# Patient Record
Sex: MALE | Race: WHITE | URBAN - METROPOLITAN AREA
[De-identification: names, ages, dates, MRNs, and addresses within clinical notes are randomized per-mention and may not be internally consistent; named-entity substitution may affect disease eponyms.]

---

## 2017-03-08 ENCOUNTER — HOSPITAL ENCOUNTER (INPATIENT)
Facility: CLINIC | Age: 22
LOS: 4 days | Discharge: HOME | DRG: 897 | End: 2017-03-12
Attending: FAMILY MEDICINE | Admitting: PSYCHIATRY & NEUROLOGY
Payer: COMMERCIAL

## 2017-03-08 ENCOUNTER — TELEPHONE (OUTPATIENT)
Dept: BEHAVIORAL HEALTH | Facility: CLINIC | Age: 22
End: 2017-03-08

## 2017-03-08 DIAGNOSIS — F11.20 HEROIN DEPENDENCE (H): ICD-10-CM

## 2017-03-08 DIAGNOSIS — F11.23 OPIOID DEPENDENCE WITH WITHDRAWAL (H): ICD-10-CM

## 2017-03-08 DIAGNOSIS — F11.20 OPIOID USE DISORDER, SEVERE, DEPENDENCE (H): Primary | ICD-10-CM

## 2017-03-08 PROBLEM — F19.20 CHEMICAL DEPENDENCY (H): Status: ACTIVE | Noted: 2017-03-08

## 2017-03-08 PROCEDURE — 12800008 ZZH R&B CD ADULT

## 2017-03-08 PROCEDURE — 99284 EMERGENCY DEPT VISIT MOD MDM: CPT | Mod: Z6 | Performed by: FAMILY MEDICINE

## 2017-03-08 PROCEDURE — 25000132 ZZH RX MED GY IP 250 OP 250 PS 637: Performed by: PSYCHIATRY & NEUROLOGY

## 2017-03-08 PROCEDURE — 99285 EMERGENCY DEPT VISIT HI MDM: CPT | Performed by: FAMILY MEDICINE

## 2017-03-08 PROCEDURE — HZ2ZZZZ DETOXIFICATION SERVICES FOR SUBSTANCE ABUSE TREATMENT: ICD-10-PCS | Performed by: PSYCHIATRY & NEUROLOGY

## 2017-03-08 RX ORDER — OXYMETAZOLINE HYDROCHLORIDE 0.05 G/100ML
2 SPRAY NASAL 2 TIMES DAILY
Status: DISCONTINUED | OUTPATIENT
Start: 2017-03-08 | End: 2017-03-09

## 2017-03-08 RX ORDER — ALUMINA, MAGNESIA, AND SIMETHICONE 2400; 2400; 240 MG/30ML; MG/30ML; MG/30ML
30 SUSPENSION ORAL EVERY 4 HOURS PRN
Status: DISCONTINUED | OUTPATIENT
Start: 2017-03-08 | End: 2017-03-12 | Stop reason: HOSPADM

## 2017-03-08 RX ORDER — TRAZODONE HYDROCHLORIDE 50 MG/1
50 TABLET, FILM COATED ORAL
Status: DISCONTINUED | OUTPATIENT
Start: 2017-03-08 | End: 2017-03-12 | Stop reason: HOSPADM

## 2017-03-08 RX ORDER — HYDROXYZINE HYDROCHLORIDE 25 MG/1
25-50 TABLET, FILM COATED ORAL EVERY 4 HOURS PRN
Status: DISCONTINUED | OUTPATIENT
Start: 2017-03-08 | End: 2017-03-12 | Stop reason: HOSPADM

## 2017-03-08 RX ORDER — BISACODYL 10 MG
10 SUPPOSITORY, RECTAL RECTAL DAILY PRN
Status: DISCONTINUED | OUTPATIENT
Start: 2017-03-08 | End: 2017-03-12 | Stop reason: HOSPADM

## 2017-03-08 RX ORDER — ONDANSETRON 4 MG/1
4 TABLET, ORALLY DISINTEGRATING ORAL EVERY 6 HOURS PRN
Status: DISCONTINUED | OUTPATIENT
Start: 2017-03-08 | End: 2017-03-12 | Stop reason: HOSPADM

## 2017-03-08 RX ORDER — POLYETHYLENE GLYCOL 3350 17 G/17G
17 POWDER, FOR SOLUTION ORAL DAILY
Status: DISCONTINUED | OUTPATIENT
Start: 2017-03-08 | End: 2017-03-12 | Stop reason: HOSPADM

## 2017-03-08 RX ORDER — IBUPROFEN 600 MG/1
600 TABLET, FILM COATED ORAL EVERY 6 HOURS PRN
Status: DISCONTINUED | OUTPATIENT
Start: 2017-03-08 | End: 2017-03-12 | Stop reason: HOSPADM

## 2017-03-08 RX ORDER — ACETAMINOPHEN 325 MG/1
650 TABLET ORAL EVERY 4 HOURS PRN
Status: DISCONTINUED | OUTPATIENT
Start: 2017-03-08 | End: 2017-03-12 | Stop reason: HOSPADM

## 2017-03-08 RX ORDER — LORAZEPAM 1 MG/1
1-2 TABLET ORAL EVERY 4 HOURS PRN
Status: DISCONTINUED | OUTPATIENT
Start: 2017-03-08 | End: 2017-03-09

## 2017-03-08 RX ORDER — SENNOSIDES 8.6 MG
2 TABLET ORAL 2 TIMES DAILY PRN
Status: DISCONTINUED | OUTPATIENT
Start: 2017-03-08 | End: 2017-03-12 | Stop reason: HOSPADM

## 2017-03-08 RX ADMIN — NICOTINE POLACRILEX 8 MG: 4 GUM, CHEWING ORAL at 18:45

## 2017-03-08 RX ADMIN — HYDROXYZINE HYDROCHLORIDE 50 MG: 25 TABLET ORAL at 21:45

## 2017-03-08 RX ADMIN — OXYMETAZOLINE HYDROCHLORIDE 2 SPRAY: 5 SPRAY NASAL at 21:15

## 2017-03-08 RX ADMIN — LORAZEPAM 2 MG: 1 TABLET ORAL at 20:17

## 2017-03-08 RX ADMIN — POLYETHYLENE GLYCOL 3350 17 G: 17 POWDER, FOR SOLUTION ORAL at 18:49

## 2017-03-08 RX ADMIN — SENNOSIDES 2 TABLET: 8.6 TABLET, FILM COATED ORAL at 18:47

## 2017-03-08 RX ADMIN — TRAZODONE HYDROCHLORIDE 50 MG: 50 TABLET ORAL at 21:14

## 2017-03-08 ASSESSMENT — ENCOUNTER SYMPTOMS
FEVER: 0
SHORTNESS OF BREATH: 0
ABDOMINAL PAIN: 0

## 2017-03-08 ASSESSMENT — ACTIVITIES OF DAILY LIVING (ADL): GROOMING: INDEPENDENT

## 2017-03-08 NOTE — PROGRESS NOTES
03/08/17 1643   Patient Belongings   Did you bring any home meds/supplements to the hospital?  No   Patient Belongings other (see comments)   Belongings Search Yes   Clothing Search Yes   Second Staff Johnny Olivier RN   Bin: 2 pairs of sweat pants, shoes   No security, no locked drawer    ADMISSION:  I am responsible for any personal items that are not sent to the safe or pharmacy. [facility] is not responsible for loss, theft or damage of any property in my possession.    Patient Signature _____________________ Date/Time _____________________    Staff Signature _______________________ Date/Time _____________________    [facility] Staff person, if patient is unable/unwilling to sign  ___________________________________ Date/Time _____________________    DISCHARGE:  My personal items have been returned to me.   Patient Signature _____________________ Date/Time _____________________

## 2017-03-08 NOTE — ED PROVIDER NOTES
History     Chief Complaint   Patient presents with     Addiction Problem     Pt here for detox from Heroin; has bed held on 3A     HPI  Etienne Moore is a 21 year old male who presents emergency room seeking detox for his heroin addiction.  Patient has been using since August and has gotten to a level of use that he's never had in the past.  Patient states she's been using up to 2 g a day.  Last use was 9:00 this morning half a gram he is not currently experiencing any withdrawal symptoms.  Patient only physical complaint is ongoing problems with constipation otherwise denies any other acute medical conditions and denies any acute psychiatric concerns.    I have reviewed the Medications, Allergies, Past Medical and Surgical History, and Social History in the Epic system.    PERSONAL MEDICAL HISTORY  Past Medical History   Diagnosis Date     Substance abuse      Heroin     PAST SURGICAL HISTORY  History reviewed. No pertinent past surgical history.  FAMILY HISTORY  No family history on file.  SOCIAL HISTORY  Social History   Substance Use Topics     Smoking status: Current Every Day Smoker     Smokeless tobacco: Not on file     Alcohol use Yes     MEDICATIONS  No current facility-administered medications for this encounter.      No current outpatient prescriptions on file.     ALLERGIES  No Known Allergies      Review of Systems   Constitutional: Negative for fever.   Respiratory: Negative for shortness of breath.    Cardiovascular: Negative for chest pain.   Gastrointestinal: Negative for abdominal pain.   All other systems reviewed and are negative.      Physical Exam   BP: 127/57  Pulse: 76  Temp: 99.1  F (37.3  C)  Resp: 16  Weight: 87.7 kg (193 lb 6 oz)  SpO2: 98 %  Physical Exam   Constitutional: He is oriented to person, place, and time. No distress.   HENT:   Head: Atraumatic.   Mouth/Throat: Oropharynx is clear and moist. No oropharyngeal exudate.   Eyes: Pupils are equal, round, and reactive to light. No  scleral icterus.   Cardiovascular: Normal heart sounds and intact distal pulses.    Pulmonary/Chest: Breath sounds normal. No respiratory distress.   Abdominal: Soft. Bowel sounds are normal. There is no tenderness.   Musculoskeletal: He exhibits no edema or tenderness.   Neurological: He is alert and oriented to person, place, and time. He has normal reflexes. No cranial nerve deficit. He exhibits normal muscle tone. Coordination normal.   Skin: Skin is warm. No rash noted. He is not diaphoretic.   Psychiatric: He does not exhibit a depressed mood. He expresses no suicidal ideation.       ED Course     ED Course     Procedures         Critical Care time:  None  Labs/Imaging:    Urine tox screen pending          Assessments & Plan (with Medical Decision Making)       I have reviewed the nursing notes.    I have reviewed the findings, diagnosis, plan and need for follow up with the patient.  Patient with heroin dependence now seeking detox and treatment patient will be admitted to station 3A for stabilization.    New Prescriptions    No medications on file       Final diagnoses:   Heroin dependence (H)       3/8/2017   [facility] [place], EMERGENCY DEPARTMENT     Abhay Olson MD  03/08/17 6429

## 2017-03-08 NOTE — TELEPHONE ENCOUNTER
S - Dr. Christianson called with clinical for 22 yo male seeking detox.     B - heroin user, using since August, up to 2 grams daily IV last use this morning. Used less than 1/2 gram this morning.  No withdrawal sxs yet.  No acute mental health concerns.  Pt was previously sober from January to August after tx.  Pt relapsed and use started escalating quickly.  No acute medical concerns.      A - vol     R - Dr. Almazan accepted for Dr. Bang / 3a CD

## 2017-03-08 NOTE — IP AVS SNAPSHOT
MRN:[id number]                      After Visit Summary   3/8/2017    Etienne Moore    MRN: [id number]           Thank you!     Thank you for choosing [facility] for your care. Our goal is always to provide you with excellent care.        Patient Information     Date Of Birth          1995        About your hospital stay     You were admitted on:  March 8, 2017 You last received care in the:  [facility]    You were discharged on:  March 12, 2017       Who to Call     For medical emergencies, please call 911.  For non-urgent questions about your medical care, please call your primary care provider or clinic, None          Attending Provider     Provider Specialty    Abhay Olson MD Emergency Medicine    Merritt, Cassandra Polk MD Pediatrics       Primary Care Provider    Physician No Ref-Primary       No address on file        Further instructions from your care team       Behavioral Discharge Planning and Instructions  THANK YOU FOR CHOOSING THE [address]  [phone]    Summary: You were admitted to Station 3A on 3/8/17 for detoxification from opiates.  A medical exam was performed that included lab work. You have met with a  and opted to return to [place] for inpatient residential Chemical Dependency treatment.  Please take care and make your recovery a priority, Etienne.     Recommendation:  Residential Treatment, psychotherapy, sober support group engagement and active work with a sponsor or  through [facility].    Main Diagnosis: Opiate dependence    Major Treatments, Procedures and Findings:  You have had a chemical dependency assessment.  You have had labs drawn and those results have been reviewed with you.  Please take a copy of your lab work with you to your next primary care physician appointment.    Symptoms to Report:  If you experience more anxiety, confusion, sleeplessness,  deep sadness or thoughts of suicide, notify your treatment team or notify your primary care physician. IF ANY OF THE SYMPTOMS YOU ARE EXPERIENCING ARE A MEDICAL EMERGENCY CALL 911 IMMEDIATELY.     Lifestyle Adjustment: Adjust your lifestyle to get enough sleep, relaxation, exercise and  good nutrition. Continue to develop healthy coping skills to decrease stress and promote a sober living environment. Do not use alcohol, illegal drugs or addictive medications other than what is currently prescribed. AA, NA, and  Sponsor are excellent resources for support.     Primary Provider:  Unit recommends scheduling a primary care appointment within 30 days of discharge. Pt reports he will do so in [place].     Resources:   [facility] [phone]    Support Group:  AA/NA and Sponsor/support    Crisis Intervention: 808.436.2456 or 114-252-9598 (TTY: 300.354.2895).  Call anytime for help.  [place]ness (www.[place].marcelo.org): 181.135.9009 or 019-732-9434.  Alcoholics Anonymous (www.alcoholics-anonymous.org): Check your phone book for your local chapter.  Suicide Awareness Voices of Education (SAVE) (www.save.org): 726-847-UHVW (1383)  National Suicide Prevention Line (www.mentalhealthmn.org): 647-037-BYVZ (3010)  Mental Health Consumer/Survivor Network of [place] (www.mhcsn.net): 169.624.1718 or 349-111-4227  Mental Health Association of [place] (www.mentalhealth.org): 492.708.2873 or 211-222-6591   Substance Abuse and Mental Health Services (www.samhsa.gov)    [place] Recovery Connection ([facility])  [facility] connects people seeking recovery to resources that help foster and sustain long-term recovery.  Whether you are seeking resources for treatment, transportation, housing, job training, education, health care or other pathways to recovery, [facility] is a great place to start.  206.658.9855.  Www.[facility].org    General Medication Instructions:   See your medication sheet(s) for instructions.   Take all  "medicines as directed.  Make no changes unless your doctor suggests them.   Go to all your doctor visits.  Be sure to have all your required lab tests. This way, your medicines can be refilled on time.  Do not use any drugs not prescribed by your provider.  AA/NA and Sponsors are excellent resources for support  Avoid alcohol.    Please Note:  If you have any questions at anytime after you are discharged please call the [facility] detox unit 3AW unit at [phone].    [facility], Behavioral Intake [phone]    Please take this discharge folder with you to all your follow up appointments, it contains your lab results, diagnosis, medication list and discharge recommendations.      THANK YOU FOR CHOOSING THE [facility]     Pending Results     No orders found from 3/6/2017 to 3/9/2017.            Admission Information     Date & Time Provider Department Dept. Phone    3/8/2017 Cassandra Bang MD [facility] [phone]      Your Vitals Were     Blood Pressure Pulse Temperature Respirations Height Weight    123/75 (BP Location: Left arm) 69 97.1  F (36.2  C) 16 1.854 m (6' 1\") 87.7 kg (193 lb 6 oz)    Pulse Oximetry BMI (Body Mass Index)                98% 25.51 kg/m2          [employer] Information     "[employer]" lets you send messages to your doctor, view your test results, renew your prescriptions, schedule appointments and more. To sign up, go to [email or web address]" . Click on \"Log in\" on the left side of the screen, which will take you to the Welcome page. Then click on \"Sign up Now\" on the right side of the page.     You will be asked to enter the access code listed below, as well as some personal information. Please follow the directions to create your username and password.     Your access code is: [email or web address]  Expires: 6/10/2017  9:43 AM     Your access code will  in 90 days. If you need " help or a new code, please call your [place] clinic or [phone].        Care EveryWhere ID     This is your Care EveryWhere ID. This could be used by other organizations to access your [place] medical records  [id number]           Review of your medicines      START taking        Dose / Directions    cloNIDine 0.1 MG tablet   Commonly known as:  CATAPRES        Dose:  0.1 mg   Take 1 tablet (0.1 mg) by mouth 3 times daily as needed   Quantity:  90 tablet   Refills:  0       gabapentin 300 MG capsule   Commonly known as:  NEURONTIN        Dose:  300 mg   Take 1 capsule (300 mg) by mouth 3 times daily as needed (anxiety)   Quantity:  90 capsule   Refills:  0       naloxone 1 mg/mL for intranasal kit (2 syringes with 2 mucosal atomizer device)   Commonly known as:  NARCAN        Give for opioid overdose. Spray one-half (1/2) of syringe contents into each nostril.  Repeat after 3 minutes if no or minimal response.   Quantity:  1 kit   Refills:  1       polyethylene glycol Packet   Commonly known as:  MIRALAX/GLYCOLAX        Dose:  17 g   Take 17 g by mouth daily as needed for constipation   Quantity:  7 packet   Refills:  0       traZODone 50 MG tablet   Commonly known as:  DESYREL        Dose:  50 mg   Take 1 tablet (50 mg) by mouth nightly as needed for sleep   Quantity:  30 tablet   Refills:  0            Where to get your medicines      These medications were sent to [facility] [place], [place] - [address]  [address] [address]     Phone:  [phone]     cloNIDine 0.1 MG tablet    gabapentin 300 MG capsule    naloxone 1 mg/mL for intranasal kit (2 syringes with 2 mucosal atomizer device)    polyethylene glycol Packet    traZODone 50 MG tablet                Protect others around you: Learn how to safely use, store and throw away your medicines at www.disposemymeds.org.             Medication List: This is a list of all your medications and when to take them.  Check marks below indicate your daily home schedule. Keep this list as a reference.      Medications           Morning Afternoon Evening Bedtime As Needed    cloNIDine 0.1 MG tablet   Commonly known as:  CATAPRES   Take 1 tablet (0.1 mg) by mouth 3 times daily as needed                                gabapentin 300 MG capsule   Commonly known as:  NEURONTIN   Take 1 capsule (300 mg) by mouth 3 times daily as needed (anxiety)   Last time this was given:  300 mg on 3/9/2017  4:13 PM                                naloxone 1 mg/mL for intranasal kit (2 syringes with 2 mucosal atomizer device)   Commonly known as:  NARCAN   Give for opioid overdose. Spray one-half (1/2) of syringe contents into each nostril.  Repeat after 3 minutes if no or minimal response.                                polyethylene glycol Packet   Commonly known as:  MIRALAX/GLYCOLAX   Take 17 g by mouth daily as needed for constipation   Last time this was given:  17 g on 3/12/2017  9:05 AM                                traZODone 50 MG tablet   Commonly known as:  DESYREL   Take 1 tablet (50 mg) by mouth nightly as needed for sleep   Last time this was given:  50 mg on 3/11/2017  9:11 PM

## 2017-03-08 NOTE — IP AVS SNAPSHOT
[facility]    [address] [facility] [address]    Phone:  [phone]                                       After Visit Summary   3/8/2017    Etienne Moore    MRN: [id number]           After Visit Summary Signature Page     I have received my discharge instructions, and my questions have been answered. I have discussed any challenges I see with this plan with the nurse or doctor.    ..........................................................................................................................................  Patient/Patient Representative Signature      ..........................................................................................................................................  Patient Representative Print Name and Relationship to Patient    ..................................................               ................................................  Date                                            Time    ..........................................................................................................................................  Reviewed by Signature/Title    ...................................................              ..............................................  Date                                                            Time

## 2017-03-09 PROBLEM — F11.23 OPIOID DEPENDENCE WITH WITHDRAWAL (H): Status: ACTIVE | Noted: 2017-03-09

## 2017-03-09 PROBLEM — F11.20 OPIOID USE DISORDER, SEVERE, DEPENDENCE (H): Status: ACTIVE | Noted: 2017-03-08

## 2017-03-09 LAB
ALBUMIN SERPL-MCNC: 4.4 G/DL (ref 3.4–5)
ALP SERPL-CCNC: 88 U/L (ref 40–150)
ALT SERPL W P-5'-P-CCNC: 47 U/L (ref 0–70)
ANION GAP SERPL CALCULATED.3IONS-SCNC: 8 MMOL/L (ref 3–14)
AST SERPL W P-5'-P-CCNC: 19 U/L (ref 0–45)
BILIRUB SERPL-MCNC: 0.7 MG/DL (ref 0.2–1.3)
BUN SERPL-MCNC: 9 MG/DL (ref 7–30)
CALCIUM SERPL-MCNC: 9.4 MG/DL (ref 8.5–10.1)
CHLORIDE SERPL-SCNC: 107 MMOL/L (ref 94–109)
CO2 SERPL-SCNC: 27 MMOL/L (ref 20–32)
CREAT SERPL-MCNC: 0.72 MG/DL (ref 0.66–1.25)
ERYTHROCYTE [DISTWIDTH] IN BLOOD BY AUTOMATED COUNT: 12 % (ref 10–15)
GFR SERPL CREATININE-BSD FRML MDRD: ABNORMAL ML/MIN/1.7M2
GLUCOSE SERPL-MCNC: 110 MG/DL (ref 70–99)
HCT VFR BLD AUTO: 43.9 % (ref 40–53)
HGB BLD-MCNC: 14.9 G/DL (ref 13.3–17.7)
MCH RBC QN AUTO: 30.1 PG (ref 26.5–33)
MCHC RBC AUTO-ENTMCNC: 33.9 G/DL (ref 31.5–36.5)
MCV RBC AUTO: 89 FL (ref 78–100)
PLATELET # BLD AUTO: 300 10E9/L (ref 150–450)
POTASSIUM SERPL-SCNC: 3.6 MMOL/L (ref 3.4–5.3)
PROT SERPL-MCNC: 7.8 G/DL (ref 6.8–8.8)
RBC # BLD AUTO: 4.95 10E12/L (ref 4.4–5.9)
SODIUM SERPL-SCNC: 142 MMOL/L (ref 133–144)
WBC # BLD AUTO: 6.8 10E9/L (ref 4–11)

## 2017-03-09 PROCEDURE — 36415 COLL VENOUS BLD VENIPUNCTURE: CPT | Performed by: PSYCHIATRY & NEUROLOGY

## 2017-03-09 PROCEDURE — 99223 1ST HOSP IP/OBS HIGH 75: CPT | Mod: AI | Performed by: PSYCHIATRY & NEUROLOGY

## 2017-03-09 PROCEDURE — 25000132 ZZH RX MED GY IP 250 OP 250 PS 637: Performed by: PSYCHIATRY & NEUROLOGY

## 2017-03-09 PROCEDURE — 85027 COMPLETE CBC AUTOMATED: CPT | Performed by: PSYCHIATRY & NEUROLOGY

## 2017-03-09 PROCEDURE — 25900015 H RX 259: Performed by: PSYCHIATRY & NEUROLOGY

## 2017-03-09 PROCEDURE — 12800008 ZZH R&B CD ADULT

## 2017-03-09 PROCEDURE — 80053 COMPREHEN METABOLIC PANEL: CPT | Performed by: PSYCHIATRY & NEUROLOGY

## 2017-03-09 RX ORDER — OXYMETAZOLINE HYDROCHLORIDE 0.05 G/100ML
2 SPRAY NASAL 2 TIMES DAILY PRN
Status: DISCONTINUED | OUTPATIENT
Start: 2017-03-09 | End: 2017-03-12 | Stop reason: HOSPADM

## 2017-03-09 RX ORDER — OXYMETAZOLINE HYDROCHLORIDE 0.05 G/100ML
2 SPRAY NASAL 2 TIMES DAILY
Status: DISCONTINUED | OUTPATIENT
Start: 2017-03-09 | End: 2017-03-09

## 2017-03-09 RX ORDER — BUPRENORPHINE 2 MG/1
4 TABLET SUBLINGUAL ONCE
Status: COMPLETED | OUTPATIENT
Start: 2017-03-12 | End: 2017-03-12

## 2017-03-09 RX ORDER — BUPRENORPHINE 2 MG/1
2 TABLET SUBLINGUAL 3 TIMES DAILY
Status: COMPLETED | OUTPATIENT
Start: 2017-03-11 | End: 2017-03-11

## 2017-03-09 RX ORDER — BUPRENORPHINE 2 MG/1
4 TABLET SUBLINGUAL ONCE
Status: COMPLETED | OUTPATIENT
Start: 2017-03-09 | End: 2017-03-09

## 2017-03-09 RX ORDER — NALOXONE HYDROCHLORIDE 0.4 MG/ML
.1-.4 INJECTION, SOLUTION INTRAMUSCULAR; INTRAVENOUS; SUBCUTANEOUS
Status: DISCONTINUED | OUTPATIENT
Start: 2017-03-09 | End: 2017-03-12 | Stop reason: HOSPADM

## 2017-03-09 RX ORDER — GABAPENTIN 300 MG/1
300 CAPSULE ORAL 3 TIMES DAILY PRN
Status: DISCONTINUED | OUTPATIENT
Start: 2017-03-09 | End: 2017-03-12 | Stop reason: HOSPADM

## 2017-03-09 RX ORDER — BUPRENORPHINE 2 MG/1
4 TABLET SUBLINGUAL ONCE
Status: DISCONTINUED | OUTPATIENT
Start: 2017-03-09 | End: 2017-03-09

## 2017-03-09 RX ORDER — BUPRENORPHINE 2 MG/1
4 TABLET SUBLINGUAL 2 TIMES DAILY
Status: COMPLETED | OUTPATIENT
Start: 2017-03-10 | End: 2017-03-10

## 2017-03-09 RX ADMIN — IBUPROFEN 600 MG: 600 TABLET, FILM COATED ORAL at 16:13

## 2017-03-09 RX ADMIN — BUPRENORPHINE HCL 4 MG: 2 TABLET SUBLINGUAL at 08:17

## 2017-03-09 RX ADMIN — CLONIDINE 1 PATCH: 0.1 PATCH TRANSDERMAL at 09:06

## 2017-03-09 RX ADMIN — HYDROXYZINE HYDROCHLORIDE 50 MG: 25 TABLET ORAL at 20:26

## 2017-03-09 RX ADMIN — BUPRENORPHINE HCL 4 MG: 2 TABLET SUBLINGUAL at 12:20

## 2017-03-09 RX ADMIN — TRAZODONE HYDROCHLORIDE 50 MG: 50 TABLET ORAL at 20:57

## 2017-03-09 RX ADMIN — SENNOSIDES 2 TABLET: 8.6 TABLET, FILM COATED ORAL at 21:00

## 2017-03-09 RX ADMIN — GABAPENTIN 300 MG: 300 CAPSULE ORAL at 16:13

## 2017-03-09 RX ADMIN — POLYETHYLENE GLYCOL 3350 17 G: 17 POWDER, FOR SOLUTION ORAL at 08:20

## 2017-03-09 RX ADMIN — LORAZEPAM 2 MG: 1 TABLET ORAL at 03:08

## 2017-03-09 RX ADMIN — TRAZODONE HYDROCHLORIDE 50 MG: 50 TABLET ORAL at 03:08

## 2017-03-09 ASSESSMENT — ACTIVITIES OF DAILY LIVING (ADL)
ORAL_HYGIENE: INDEPENDENT
DRESS: STREET CLOTHES;SCRUBS (BEHAVIORAL HEALTH)
GROOMING: INDEPENDENT

## 2017-03-09 NOTE — PLAN OF CARE
"Problem: General Plan of Care (Inpatient Behavioral)  Goal: Individualization/Patient Specific Goal (IP Behavioral)  The patient and/or their representative will achieve their patient-specific goals related to the plan of care.    The patient-specific goals include: opiate withdrawal, evaluate for treatment, laboratory evaluations   Patient will identify reason(s) for hospitalization from their perspective.  Patient will identify a goal for discharge.  Patient will identify triggers that may increase their risk for relapse.  Patient will identify coping skills they can do to stay well.   Patient will identify their support system to demonstrate readiness for discharge.     I   Illnesses Management & Recovery  Patient identified \"being around old friends, as triggers for relapse.  Patient identified  as a general wellness strategy.  Patient identified  as a warning sign that they are in danger of relapse.  Patient identified  as someone they cont on to get feedback from.  Patient identified \" Call sponsor or go to a group\" as ways to take action when in danger of relapse.  Patient identified \" Stay away from old using friends, keep busy with hobbies, spending time with supportive people\" as ways to cope with stress or other symptoms.                    "

## 2017-03-09 NOTE — PROGRESS NOTES
Case Management Note  3/9/2017    Writer met with pt to initiate discharge planning. Pt reports he is undecided whether he will go to treatment in [place] or [place], but most likely it will be [place]. Pt reports his family is here in [place], but his sober support network is in [place]. Pt reports when he completes detox, his sponsor will buy him a plane ticket to fly him to [place] for residential treatment. Pt declined case management, assessment and referral at this time. Pt advised to seek assistance as needed.    Addendum    Pt met with writer to inquire if there was anyone he could talk to about his options after discharge. This writer reminded pt of our earlier conversation, pt responded, that he was still asleep when we spoke this morning. Pt reports he would now like to discuss what programs he may qualify for after discharge. Pt encouraged to complete his intake information worksheet for assessment purposes. This writer explained to pt that his assessment would give us a better idea of what programs would be an appropriate level of care for him. Pt verbalized understanding and reports he will begin working on his intake information worksheet paperwork. Pt reports he is still looking at [place] as his first option, but would like to know what is available here in [place].    Dmitri Simpson MA, LADC

## 2017-03-09 NOTE — PLAN OF CARE
"Problem: Substance Withdrawal  Goal: Substance Withdrawal  Signs and symptoms of listed problems will be absent or manageable.   Outcome: Therapy, progress toward functional goals is gradual  Patient identified \"to detox from heroin, to be able to sit back and think about what's next\" as reasons for hospitalization.  Patient identified the following goals for discharge: \"walk out of detox with a clear mind, set on what's next after treatment, start being active on my program\"  Patient identified \"being around old friends, to stop listening to support group, taking back self will\" as triggers that may increase their risk for relapse.  Patient identified coping skills they can do to stay well: \" stay away from old friends and habits, learn from my mistakes, listen [to] sponsor or support group.\"  Patient identified their support system to demonstrate readiness for discharge as \"Family members: Chasity [place]/ Friends: Jose Cruz; Plan to use support system: \"To go back to [place] and home group.\"  If in crisis, pt plans to \"call sponsor; if it's bad call 911\"      "

## 2017-03-09 NOTE — H&P
"Addiction Medicine History and Physical    Etienne Moore MRN# [id number]   Age: 21 year old YOB: 1995     Date of Admission:  3/8/2017  Date of H&P:   March 9, 2017    Home clinic: Patient denies  Primary care provider: No Ref-Primary, Physician          Assessment and Plan:   Assessment:   Principal Problem:    Opioid dependence with withdrawal (H)  Active Problems:    Opioid use disorder, severe, dependence (H)        Plan:   Admit to 3A  Monitor and manage opioid withdrawal with comfort medication and buprenorphine taper  Patient leaning toward no maintenance medication, but still considering  Dispo planning ongoing; see CM notes for details but patient treatment-seeking and open to team recommendations; this writer recommends staying in [place] for treatment and to remain on buprenorphine maintenance, in which case he needs to find a provider in community          Chief Complaint:   Opioid (heroin) withdrawal     History is obtained from the patient, and chart review          History of Present Illness:   This patient is a 21 year old single employed male, living with family, with chronic opioid (smoked heroin) use disorder, who presented to our ED seeking detoxification and treatment. Originally from [place], and has family here, but traveled to [place] for his last treatment. He was there for close to a year, in remission for about 10 months, returned to [place] in August 2016 and relapsed after about two days. He denies craving or precoccupation as factors in deciding to move back, but quickly was surrounded by old using friends and \"it was just there\". 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 Family is supportive, including two siblings, but recognizes he needs a sober network. He has a 12-step sponsor in [place]. He has been smoking 1-2 grams heroin daily. Denies ever IV use. Declines blood-borne pathogen and STI testing. Denies other illnesses, or " psychiatric complaints. Denies recent injuries/fights. He is quite patient with withdrawal experience, bothered by sense of tremulousness, but calmly endorses runny nose/stuffiness, myalgias, nausea, chills/flushing. Infrequent other substance use of cocaine and cannabis. Is admitted voluntarily.             Past Medical History:   I have reviewed this patient's past medical history, and he denies chronic illness outside of the above.          Past Surgical History:     History reviewed. No pertinent past surgical history. Patient denies.          Social History:   I have reviewed this patient's social history, see HPI.         Family History:   I have reviewed this patient's family history          Immunizations:     There is no immunization history on file for this patient.          Allergies:   All allergies reviewed and addressed  No Known Allergies          Medications:     No prescriptions prior to admission.      [facility] query result:  Indicates no controlled prescriptions reported in previous 12 months. [place] database not available for query.         Review of Systems:   Complete ROS performed and is negative except as noted in HPI.           Physical Exam:     Vitals were reviewed  Patient Vitals for the past 12 hrs:   BP Temp Temp src Pulse Resp   03/08/17 2013 130/76 99.1  F (37.3  C) Tympanic 73 16     Constitutional:   Tired but alert, non-toxic, minimal disress (patient and stoic)     Eyes:   Anicteric, non-injected, pupils moderately dilated for light level     ENT:   Clear rhinorrhea, mmm     Lungs:   no increased work of breathing and clear to auscultation     Cardiovascular:   Well-perfused, no murmur, no edema     Abdomen:   Non-distended, active bowel sounds, soft, NT, no HSM     Neurologic:   No tremor, normal tone, gait and coordination intact     Skin:   Clear, flushed, no jaundice     MENTAL STATUS EXAM  Appearance: in scrubs, unkempt  Attitude: engageable, cooperative  Behavior: mild  "psychomotor restlessnes  Eyre Contact: focused on examiner  Speech: coherent, non-pressured  Orientation: oriented to person , place, time and situation  Mood:  \"fine\"  Affect: subdued, pleasant  Thought Process: linear, goal-directed, no FOI or JEREMIAH  Suicidal Ideation: denies thought/intent/plan  Hallucination: denies  Insight: partial/immature  Judgment: adequate for safety          Data:   All laboratory data reviewed  Results for orders placed or performed during the hospital encounter of 03/08/17   Comprehensive metabolic panel   Result Value Ref Range    Sodium 142 133 - 144 mmol/L    Potassium 3.6 3.4 - 5.3 mmol/L    Chloride 107 94 - 109 mmol/L    Carbon Dioxide 27 20 - 32 mmol/L    Anion Gap 8 3 - 14 mmol/L    Glucose 110 (H) 70 - 99 mg/dL    Urea Nitrogen 9 7 - 30 mg/dL    Creatinine 0.72 0.66 - 1.25 mg/dL    GFR Estimate >90  Non  GFR Calc   >60 mL/min/1.7m2    GFR Estimate If Black >90   GFR Calc   >60 mL/min/1.7m2    Calcium 9.4 8.5 - 10.1 mg/dL    Bilirubin Total 0.7 0.2 - 1.3 mg/dL    Albumin 4.4 3.4 - 5.0 g/dL    Protein Total 7.8 6.8 - 8.8 g/dL    Alkaline Phosphatase 88 40 - 150 U/L    ALT 47 0 - 70 U/L    AST 19 0 - 45 U/L   CBC with platelets   Result Value Ref Range    WBC 6.8 4.0 - 11.0 10e9/L    RBC Count 4.95 4.4 - 5.9 10e12/L    Hemoglobin 14.9 13.3 - 17.7 g/dL    Hematocrit 43.9 40.0 - 53.0 %    MCV 89 78 - 100 fl    MCH 30.1 26.5 - 33.0 pg    MCHC 33.9 31.5 - 36.5 g/dL    RDW 12.0 10.0 - 15.0 %    Platelet Count 300 150 - 450 10e9/L         Attestation:  I have reviewed today's vital signs, notes, medications, and labs/studies pertinent to this encounter.    Indication for hospitalization is severe opioid use disorder with physical dependence and withdrawal; high degree of complexity due to potential withdrawal morbidity, emerging adult status.    Cassandra Bang MD   Pediatrics/Addiction Medicine  "

## 2017-03-09 NOTE — PROGRESS NOTES
"SPIRITUAL HEALTH SERVICES Progress Note  [facility] ([facility]) 3AW       DATA:    Initial visit -- per \"hospital \"/patient request; I encountered Etienne in the common [place] area at the edge of the [facility] area.  Etienne had just taken his evening medications; he was weary and needed to rest.  We agreed that his rest was the priority and made a plan to meet on Thursday.       INTERVENTION:    Introduction SHS.        OUTCOME:    Etienne ws able to advocate for what he needed most -- rest.       PLAN:    Etienne and I made a plan to meet on Thursday, 9 March.                                                                                                                                              Felipa Tracey  [facility]    Pager [phone]    "

## 2017-03-09 NOTE — PLAN OF CARE
"Problem: General Plan of Care (Inpatient Behavioral)  Goal: Individualization/Patient Specific Goal (IP Behavioral)  The patient and/or their representative will achieve their patient-specific goals related to the plan of care.    The patient-specific goals include: opiate withdrawal, evaluate for treatment, laboratory evaluations   GWEN Moore is a 21 year old year old male with a chief complaint of Addiction Problem  S = Situation:   Admit for opiate withdrawal  B  = Background:   Pt reports using heroin, smoked 1-2 grams a day since August when he relapsed after 10 months of sobriety.  His last use was on 3/8/2017 at 9:00am.  Pt has never used IV drugs.  Patient has been involved in recovery and went to treatment in [place] 1 yr ago--he has a sponsor there and support but it is also where he went last time he relapsed.  Pt is interested in treatment again.  He is on no medications, denies mental health history although he endorses anxiety.  Pt undecided whether he would go to treatment in [place] or [place]---he reports that he will follow the recommendations of the medical staff.  A  =  Assessment:   Vital Signs: /78 (BP Location: Left arm)  Pulse 86  Temp 98.1  F (36.7  C) (Tympanic)  Resp 16  Ht 1.854 m (6' 1\")  Wt 87.7 kg (193 lb 6 oz)  SpO2 98%  BMI 25.51 kg/m2  Alert and oriented X3, no SI, no SIB.  Pt used this am so he isn't complaining of significant discomfort.  He reports anxiety and some muscle aches.  R =   Request or Recommendation:   Opiate withdrawal monitoring, Dr. Guillen to evaluate, case management to see                    "

## 2017-03-10 PROCEDURE — 12800008 ZZH R&B CD ADULT

## 2017-03-10 PROCEDURE — 25000132 ZZH RX MED GY IP 250 OP 250 PS 637: Performed by: PSYCHIATRY & NEUROLOGY

## 2017-03-10 PROCEDURE — 25900015 H RX 259: Performed by: PSYCHIATRY & NEUROLOGY

## 2017-03-10 PROCEDURE — 99232 SBSQ HOSP IP/OBS MODERATE 35: CPT | Performed by: PSYCHIATRY & NEUROLOGY

## 2017-03-10 RX ORDER — POLYETHYLENE GLYCOL 3350 17 G/17G
17 POWDER, FOR SOLUTION ORAL DAILY PRN
Qty: 7 PACKET | Refills: 0 | Status: SHIPPED | OUTPATIENT
Start: 2017-03-10

## 2017-03-10 RX ORDER — CLONIDINE HYDROCHLORIDE 0.1 MG/1
0.1 TABLET ORAL 3 TIMES DAILY PRN
Qty: 90 TABLET | Refills: 0 | Status: SHIPPED | OUTPATIENT
Start: 2017-03-10

## 2017-03-10 RX ORDER — GABAPENTIN 300 MG/1
300 CAPSULE ORAL 3 TIMES DAILY PRN
Qty: 90 CAPSULE | Refills: 0 | Status: SHIPPED | OUTPATIENT
Start: 2017-03-10

## 2017-03-10 RX ADMIN — TRAZODONE HYDROCHLORIDE 50 MG: 50 TABLET ORAL at 22:52

## 2017-03-10 RX ADMIN — HYDROXYZINE HYDROCHLORIDE 50 MG: 25 TABLET ORAL at 20:15

## 2017-03-10 RX ADMIN — OXYMETAZOLINE HYDROCHLORIDE 2 SPRAY: 0.05 SPRAY NASAL at 10:06

## 2017-03-10 RX ADMIN — TRAZODONE HYDROCHLORIDE 50 MG: 50 TABLET ORAL at 21:38

## 2017-03-10 RX ADMIN — IBUPROFEN 600 MG: 600 TABLET, FILM COATED ORAL at 14:58

## 2017-03-10 RX ADMIN — BUPRENORPHINE HCL 4 MG: 2 TABLET SUBLINGUAL at 20:55

## 2017-03-10 RX ADMIN — BUPRENORPHINE HCL 4 MG: 2 TABLET SUBLINGUAL at 08:53

## 2017-03-10 RX ADMIN — NICOTINE POLACRILEX 4 MG: 4 GUM, CHEWING ORAL at 10:55

## 2017-03-10 RX ADMIN — POLYETHYLENE GLYCOL 3350 17 G: 17 POWDER, FOR SOLUTION ORAL at 08:53

## 2017-03-10 ASSESSMENT — ACTIVITIES OF DAILY LIVING (ADL)
GROOMING: INDEPENDENT
ORAL_HYGIENE: INDEPENDENT

## 2017-03-10 NOTE — PROGRESS NOTES
Addiction Medicine Progress Note          Assessment and Plan:   Assessment:   Principal Problem:    Opioid dependence with withdrawal (H), well managed  Active Problems:    Opioid use disorder, severe, dependence (H), on buprenorphine taper, declines plan for maintenance        Plan:   Continue to monitor and manage opioid withdrawal with adjunctive medications and buprenorphine taper  Last dose buprenorphine set for 3/12/2017  Patient would like to switch to PO clonidine on discharge  Dispo planning ongoing; see CM notes for details, but while team hopes he will stay for firm treatment plan, patient may discharge on request         Interval History:   No acute events. He reports feeling much better, no withdrawal or cravings. He is aware this will change as buprenorphine decreases and is discontinued. He continues to decline recommendation for maintenance. He does feel clonidine has been helpful for withdrawal anxiety/irritability, would like to continue on discharge, but to switch to pills. He would also like gabapentin for withdrawal anxiety, especially as buprenorphine effect diminishes. He remains ambivalent about treatment options, but is working with CM. He has sober support network in [place], but family is here.           Review of Systems:   Complete ROS performed and is negative except as noted in interval history           Medications:     Current Facility-Administered Medications   Medication     cloNIDine (CATAPRES-TTS) Patch in Place     [START ON 3/16/2017] cloNIDine (CATAPRES-TTS) patch REMOVAL     cloNIDine (CATAPRES-TTS1) 0.1 MG/24HR WK patch 1 patch     buprenorphine (SUBUTEX) sublingual tablet 4 mg     [START ON 3/11/2017] buprenorphine (SUBUTEX) sublingual tablet 2 mg     naloxone (NARCAN) injection 0.1-0.4 mg     [START ON 3/12/2017] buprenorphine (SUBUTEX) sublingual tablet 4 mg     gabapentin (NEURONTIN) capsule 300 mg     oxymetazoline (AFRIN) 0.05 % spray 2 spray     hydrOXYzine (ATARAX)  "tablet 25-50 mg     acetaminophen (TYLENOL) tablet 650 mg     alum & mag hydroxide-simethicone (MYLANTA ES/MAALOX  ES) suspension 30 mL     magnesium hydroxide (MILK OF MAGNESIA) suspension 30 mL     bisacodyl (DULCOLAX) Suppository 10 mg     traZODone (DESYREL) tablet 50 mg     nicotine polacrilex (NICORETTE) gum 4-8 mg     ondansetron (ZOFRAN-ODT) ODT tab 4 mg     ibuprofen (ADVIL/MOTRIN) tablet 600 mg     polyethylene glycol (MIRALAX/GLYCOLAX) Packet 17 g     sennosides (SENOKOT) tablet 2 tablet             Physical Exam:     Vitals were reviewed  Patient Vitals for the past 12 hrs:   BP Temp Temp src Pulse Resp   03/09/17 2015 125/76 98.3  F (36.8  C) Tympanic 82 16     Constitutional:   Alert, well-appearing, no distress     Eyes:   Anicteric, non-injected, PERRL     ENT:   No rhinorrhea, mmm     Lungs:   no increased work of breathing and clear to auscultation     Cardiovascular:   Well-perfused, no murmur, no edema     Neurologic:   No tremor, normal tone, gait and coordination intact     Skin: Clear, dry, no flushing, no jaundice    MENTAL STATUS EXAM  Appearance: in scrubs, adequate hygiene, fair grooming  Attitude:emgaged,  cooperative  Behavior: no agitation or slowing  Eye Contact: focused on examiner  Speech: coherent, no pressuring  Orientation: oriented to person , place, time and situation  Mood:  \"pretty good\"  Affect: bright, full range, normal reactivity  Thought Process: linear, goal-directed, no FOI or JEREMIAH  Suicidal Ideation: denies thought/intent/plan  Hallucination: denies  Insight: partial/immature  Judgment: adequate for safety          Data:   All laboratory data reviewed, no new interval data    Attestation:  I have reviewed today's vital signs, notes, medications, and labs/studies pertinent to this encounter.     Cassandra Bang MD   Pediatrics/Addiction Medicine    "

## 2017-03-10 NOTE — PROGRESS NOTES
Case Management Note  3/10/2017    Writer met with pt to discuss his plans after discharge. Pt confirms he is returning to [place] to enter into treatment there. Pt reports he has been in contact with his sponsor, who told him today, he was purchasing him a ticket for Tuesday, 3/14/17. Pt will discharge detox, Sunday, 3/12/17. Case management complete.    Dmitri Simpson MA, LADC

## 2017-03-10 NOTE — PROGRESS NOTES
"SPIRITUAL HEALTH SERVICES Progress Note  [facility] ([facility]) 3AW       DATA:    Follow-up visit with Etienne; we encountered one another twice today (i.e., once in the afternoon; once after the evening 12-Step meeting).       INTERVENTION:    Introduction of SHS.       OUTCOME:    Etienne was able to say \"no thank you\" that he was not ready for a SHS visit today.       PLAN:    SHS remains available to Etienne.                                                                                                                                              Felipa Tracey  Volunteer    Pager [phone]    "

## 2017-03-11 PROCEDURE — 12800008 ZZH R&B CD ADULT

## 2017-03-11 PROCEDURE — 25900015 H RX 259: Performed by: PSYCHIATRY & NEUROLOGY

## 2017-03-11 PROCEDURE — 25000132 ZZH RX MED GY IP 250 OP 250 PS 637: Performed by: PSYCHIATRY & NEUROLOGY

## 2017-03-11 RX ORDER — TRAZODONE HYDROCHLORIDE 50 MG/1
50 TABLET, FILM COATED ORAL
Qty: 30 TABLET | Refills: 0 | Status: SHIPPED | OUTPATIENT
Start: 2017-03-11

## 2017-03-11 RX ADMIN — NICOTINE POLACRILEX 4 MG: 4 GUM, CHEWING ORAL at 12:57

## 2017-03-11 RX ADMIN — BUPRENORPHINE HCL 2 MG: 2 TABLET SUBLINGUAL at 14:05

## 2017-03-11 RX ADMIN — POLYETHYLENE GLYCOL 3350 17 G: 17 POWDER, FOR SOLUTION ORAL at 08:13

## 2017-03-11 RX ADMIN — BUPRENORPHINE HCL 2 MG: 2 TABLET SUBLINGUAL at 20:15

## 2017-03-11 RX ADMIN — TRAZODONE HYDROCHLORIDE 50 MG: 50 TABLET ORAL at 21:11

## 2017-03-11 RX ADMIN — BUPRENORPHINE HCL 2 MG: 2 TABLET SUBLINGUAL at 08:13

## 2017-03-11 RX ADMIN — HYDROXYZINE HYDROCHLORIDE 50 MG: 25 TABLET ORAL at 16:31

## 2017-03-11 RX ADMIN — HYDROXYZINE HYDROCHLORIDE 50 MG: 25 TABLET ORAL at 20:15

## 2017-03-11 ASSESSMENT — ACTIVITIES OF DAILY LIVING (ADL)
GROOMING: HANDWASHING;INDEPENDENT
GROOMING: INDEPENDENT
ORAL_HYGIENE: INDEPENDENT
ORAL_HYGIENE: INDEPENDENT
DRESS: STREET CLOTHES;INDEPENDENT

## 2017-03-12 VITALS
SYSTOLIC BLOOD PRESSURE: 123 MMHG | DIASTOLIC BLOOD PRESSURE: 75 MMHG | HEIGHT: 73 IN | HEART RATE: 69 BPM | OXYGEN SATURATION: 98 % | WEIGHT: 193.38 LBS | TEMPERATURE: 97.1 F | RESPIRATION RATE: 16 BRPM | BODY MASS INDEX: 25.63 KG/M2

## 2017-03-12 PROCEDURE — 25000132 ZZH RX MED GY IP 250 OP 250 PS 637: Performed by: PSYCHIATRY & NEUROLOGY

## 2017-03-12 PROCEDURE — 25900015 H RX 259: Performed by: PSYCHIATRY & NEUROLOGY

## 2017-03-12 RX ADMIN — BUPRENORPHINE HCL 4 MG: 2 TABLET SUBLINGUAL at 09:05

## 2017-03-12 RX ADMIN — POLYETHYLENE GLYCOL 3350 17 G: 17 POWDER, FOR SOLUTION ORAL at 09:05

## 2017-03-12 ASSESSMENT — ACTIVITIES OF DAILY LIVING (ADL)
GROOMING: INDEPENDENT
ORAL_HYGIENE: INDEPENDENT
DRESS: STREET CLOTHES
LAUNDRY: WITH SUPERVISION

## 2017-03-12 NOTE — PROGRESS NOTES
Patient discharged at 1000 to home. Patient has an intake for treatment in [place] this upcoming Tuesday, March 14th 2017. Patient reports feeling happy and hopeful for the future. All patient belongings from room and locker sent with patient. This RN went over discharge instructions, teachings, patient's labs, and unit recommendations with patient. This RN went over patient's prescribed medications being sent home with patient by pharmacy. Patient verbalizes and demonstrates understanding of all teachings. Patient denies thoughts of harm towards self or others. Patient denies auditory/visual hallucinations. Pt denies any current medication side effects or medical issues at this time. Patient denies any further questions and is now discharged at this time.

## 2017-03-12 NOTE — DISCHARGE INSTRUCTIONS
Behavioral Discharge Planning and Instructions  THANK YOU FOR CHOOSING THE [facility]  3A  [phone]    Summary: You were admitted to Station 3A on 3/8/17 for detoxification from opiates.  A medical exam was performed that included lab work. You have met with a  and opted to return to [place] for inpatient residential Chemical Dependency treatment.  Please take care and make your recovery a priority, Etienne.     Recommendation:  Residential Treatment, psychotherapy, sober support group engagement and active work with a sponsor or  through [facility].    Main Diagnosis: Opiate dependence    Major Treatments, Procedures and Findings:  You have had a chemical dependency assessment.  You have had labs drawn and those results have been reviewed with you.  Please take a copy of your lab work with you to your next primary care physician appointment.    Symptoms to Report:  If you experience more anxiety, confusion, sleeplessness, deep sadness or thoughts of suicide, notify your treatment team or notify your primary care physician. IF ANY OF THE SYMPTOMS YOU ARE EXPERIENCING ARE A MEDICAL EMERGENCY CALL 911 IMMEDIATELY.     Lifestyle Adjustment: Adjust your lifestyle to get enough sleep, relaxation, exercise and  good nutrition. Continue to develop healthy coping skills to decrease stress and promote a sober living environment. Do not use alcohol, illegal drugs or addictive medications other than what is currently prescribed. AA, NA, and  Sponsor are excellent resources for support.     Primary Provider:  Unit recommends scheduling a primary care appointment within 30 days of discharge. Pt reports he will do so in [place].     Resources:   [facility] [phone]    Support Group:  AA/NA and Sponsor/support    Crisis Intervention: 807.445.9878 or 278-335-9588 (TTY: 214.420.5619).  Call anytime for help.  National [place] on Mental Illness  (www.[place].org): 561.701.4675 or 784-784-4469.  Alcoholics Anonymous (www.alcoholics-anonymous.org): Check your phone book for your local chapter.  Suicide Awareness Voices of Education (SAVE) (www.save.org): 757-629-WKAN (5183)  National Suicide Prevention Line (www.mentalhealthmn.org): 760-309-QLHC (7520)  Mental Health Consumer/Survivor Network of [place] (www.mhcsn.net): 562.802.9291 or 453-262-7448  Mental Health Association of [place] (www.mentalhealth.org): 159.572.4690 or 010-293-2045   Substance Abuse and Mental Health Services (www.samhsa.gov)    [place] [facility] ([facility])  [facility] connects people seeking recovery to resources that help foster and sustain long-term recovery.  Whether you are seeking resources for treatment, transportation, housing, job training, education, health care or other pathways to recovery, [facility] is a great place to start.  680.917.7540.  Www.[facility].org    General Medication Instructions:   See your medication sheet(s) for instructions.   Take all medicines as directed.  Make no changes unless your doctor suggests them.   Go to all your doctor visits.  Be sure to have all your required lab tests. This way, your medicines can be refilled on time.  Do not use any drugs not prescribed by your provider.  AA/NA and Sponsors are excellent resources for support  Avoid alcohol.    Please Note:  If you have any questions at anytime after you are discharged please call the [facility] [place] detox unit 3AW unit at [phone].    [facility], Behavioral Intake [phone]    Please take this discharge folder with you to all your follow up appointments, it contains your lab results, diagnosis, medication list and discharge recommendations.      THANK YOU FOR CHOOSING THE [facility]

## 2017-03-12 NOTE — PROGRESS NOTES
Patient instructed to remove clonidine patch, as patient will be going home with p.o. Version of clonidine instead

## 2017-03-12 NOTE — PROGRESS NOTES
Cow scores 3 and 3 this pm. Was more anxious as he looks to leave and then go to [place] in a few days for treatment. Was given antianxiety medication x 2 and sleep also.

## 2017-03-16 NOTE — DISCHARGE SUMMARY
Addiction Medicine Discharge Summary    Etienne Moore MRN# [id number]   Age: 21 year old YOB: 1995     Date of Admission:  3/8/2017  Date of Discharge::  3/12/2017 10:01 AM  Admitting Physician:  Cassandra Bang MD  Discharge Physician:  Cassandra Bang MD     Home clinic: Patient denies  Primary care provider: No Ref-Primary, Physician          Admission Diagnoses:   Heroin dependence (H) [F11.20]          Discharge Diagnosis:   Principal Problem:    Opioid dependence with withdrawal (H), resolved  Active Problems:    Opioid use disorder, severe, dependence (H), declines maintenance medication options         Procedures:   All laboratory data reviewed  Results for orders placed or performed during the hospital encounter of 03/08/17   Comprehensive metabolic panel   Result Value Ref Range    Sodium 142 133 - 144 mmol/L    Potassium 3.6 3.4 - 5.3 mmol/L    Chloride 107 94 - 109 mmol/L    Carbon Dioxide 27 20 - 32 mmol/L    Anion Gap 8 3 - 14 mmol/L    Glucose 110 (H) 70 - 99 mg/dL    Urea Nitrogen 9 7 - 30 mg/dL    Creatinine 0.72 0.66 - 1.25 mg/dL    GFR Estimate >90  Non  GFR Calc   >60 mL/min/1.7m2    GFR Estimate If Black >90   GFR Calc   >60 mL/min/1.7m2    Calcium 9.4 8.5 - 10.1 mg/dL    Bilirubin Total 0.7 0.2 - 1.3 mg/dL    Albumin 4.4 3.4 - 5.0 g/dL    Protein Total 7.8 6.8 - 8.8 g/dL    Alkaline Phosphatase 88 40 - 150 U/L    ALT 47 0 - 70 U/L    AST 19 0 - 45 U/L   CBC with platelets   Result Value Ref Range    WBC 6.8 4.0 - 11.0 10e9/L    RBC Count 4.95 4.4 - 5.9 10e12/L    Hemoglobin 14.9 13.3 - 17.7 g/dL    Hematocrit 43.9 40.0 - 53.0 %    MCV 89 78 - 100 fl    MCH 30.1 26.5 - 33.0 pg    MCHC 33.9 31.5 - 36.5 g/dL    RDW 12.0 10.0 - 15.0 %    Platelet Count 300 150 - 450 10e9/L            Medications Prior to Admission:     No prescriptions prior to admission.      [place]:  Indicates no controlled prescriptions reported in  "previous 12 months. [place] database not available for query.          Discharge Medications:     Discharge Medication List as of 3/12/2017  9:43 AM      START taking these medications    Details   traZODone (DESYREL) 50 MG tablet Take 1 tablet (50 mg) by mouth nightly as needed for sleep, Disp-30 tablet, R-0, E-Prescribe      gabapentin (NEURONTIN) 300 MG capsule Take 1 capsule (300 mg) by mouth 3 times daily as needed (anxiety), Disp-90 capsule, R-0, E-Prescribe      polyethylene glycol (MIRALAX/GLYCOLAX) Packet Take 17 g by mouth daily as needed for constipation, Disp-7 packet, R-0, E-Prescribe      cloNIDine (CATAPRES) 0.1 MG tablet Take 1 tablet (0.1 mg) by mouth 3 times daily as needed, Disp-90 tablet, R-0, E-Prescribe      naloxone (NARCAN) 1 mg/mL for intranasal kit (2 syringes with 2 mucosal atomizer device) Give for opioid overdose. Spray one-half (1/2) of syringe contents into each nostril.  Repeat after 3 minutes if no or minimal response., Disp-1 kit, R-1, E-PrescribeFor intranasal administration: Dispense 2 naloxone injection 2 mg/2 mL syringes.  Inclu de 2 mucosal atomization devices (MAD-Nasal).                Consultations:   No consultations were requested during this admission          Brief History of Illness:   This patient is a 21 year old single employed male, living with family, with chronic opioid (smoked heroin) use disorder, who presented to our ED seeking detoxification and treatment. Originally from [place], and has family here, but traveled to [place] for his last treatment. He was there for close to a year, in remission for about 10 months, returned to [place] in August 2016 and relapsed after about two days. He denies craving or precoccupation as factors in deciding to move back, but quickly was surrounded by old using friends and \"it was just there\". He has a job with a [employer] company that he can return to when done with treatment. Family is supportive, including two siblings, " but recognizes he needs a sober network. He has a 12-step sponsor in [place]. He has been smoking 1-2 grams heroin daily. Denies ever IV use. Declines blood-borne pathogen and STI testing. Denies other illnesses, or psychiatric complaints. Denies recent injuries/fights. He is quite patient with withdrawal experience, bothered by sense of tremulousness, but calmly endorses runny nose/stuffiness, myalgias, nausea, chills/flushing. Infrequent other substance use of cocaine and cannabis. Is admitted voluntarily.           Hospital Course:   Opiid withdrawal was monitored and managed with buprenorphine taper and adjunctive medications including clonidine. He chose to switch to PO clonidine at discharge. He considered treatment in [place], where he has family, versus returning to [place] where he has built a sober support network, including sponsor with whom he maintained contact during admission. He declined maintenance medication options, and made arrangements to fly back to [place] and shortly thereafter intake at treatment program.           Discharge Instructions and Follow-Up:   Discharge diet: Regular   Discharge activity: Activity as tolerated   Discharge follow-up: See AVS           Discharge Disposition:   Discharged to self, with plan to fly back to [place] and attend treatment intake on 3/14/2017      Attestation:  I have reviewed today's vital signs, notes, medications, and labs/studies pertinent to this encounter.    Cassandra Bang MD   Pediatrics/Addiction Medicine

## 2018-08-24 NOTE — PROGRESS NOTES
Patient requesting to discharge today, per Dr. Bang OK for patient to dc. Order has been placed.    Out of season